# Patient Record
Sex: FEMALE | URBAN - METROPOLITAN AREA
[De-identification: names, ages, dates, MRNs, and addresses within clinical notes are randomized per-mention and may not be internally consistent; named-entity substitution may affect disease eponyms.]

---

## 2018-08-10 ENCOUNTER — RECORDS - HEALTHEAST (OUTPATIENT)
Dept: LAB | Facility: CLINIC | Age: 83
End: 2018-08-10

## 2018-08-13 LAB
ANION GAP SERPL CALCULATED.3IONS-SCNC: 14 MMOL/L (ref 5–18)
BUN SERPL-MCNC: 16 MG/DL (ref 8–28)
CALCIUM SERPL-MCNC: 10.3 MG/DL (ref 8.5–10.5)
CHLORIDE BLD-SCNC: 104 MMOL/L (ref 98–107)
CO2 SERPL-SCNC: 20 MMOL/L (ref 22–31)
CREAT SERPL-MCNC: 0.78 MG/DL (ref 0.6–1.1)
ERYTHROCYTE [DISTWIDTH] IN BLOOD BY AUTOMATED COUNT: 15.1 % (ref 11–14.5)
GFR SERPL CREATININE-BSD FRML MDRD: >60 ML/MIN/1.73M2
GLUCOSE BLD-MCNC: 175 MG/DL (ref 70–125)
HBA1C MFR BLD: 6.2 % (ref 4.2–6.1)
HCT VFR BLD AUTO: 39.9 % (ref 35–47)
HGB BLD-MCNC: 12.2 G/DL (ref 12–16)
MCH RBC QN AUTO: 27.5 PG (ref 27–34)
MCHC RBC AUTO-ENTMCNC: 30.6 G/DL (ref 32–36)
MCV RBC AUTO: 90 FL (ref 80–100)
PLATELET # BLD AUTO: 639 THOU/UL (ref 140–440)
PMV BLD AUTO: 9.4 FL (ref 8.5–12.5)
POTASSIUM BLD-SCNC: 4.6 MMOL/L (ref 3.5–5)
RBC # BLD AUTO: 4.43 MILL/UL (ref 3.8–5.4)
SODIUM SERPL-SCNC: 138 MMOL/L (ref 136–145)
TSH SERPL DL<=0.005 MIU/L-ACNC: 1.81 UIU/ML (ref 0.3–5)
WBC: 10.5 THOU/UL (ref 4–11)

## 2018-08-28 ENCOUNTER — TRANSFERRED RECORDS (OUTPATIENT)
Dept: HEALTH INFORMATION MANAGEMENT | Facility: CLINIC | Age: 83
End: 2018-08-28

## 2018-08-28 ENCOUNTER — PRE VISIT (OUTPATIENT)
Dept: OPHTHALMOLOGY | Facility: CLINIC | Age: 83
End: 2018-08-28

## 2018-08-28 PROBLEM — E11.9 DIABETES MELLITUS (H): Status: ACTIVE | Noted: 2018-08-04

## 2018-08-28 PROBLEM — K57.92 DIVERTICULITIS: Status: ACTIVE | Noted: 2018-08-05

## 2018-08-28 PROBLEM — I10 HTN (HYPERTENSION): Status: ACTIVE | Noted: 2018-08-04

## 2018-08-28 PROBLEM — R12 HEARTBURN: Status: ACTIVE | Noted: 2018-08-04

## 2018-08-28 PROBLEM — N39.0 UTI (URINARY TRACT INFECTION): Status: ACTIVE | Noted: 2018-08-04

## 2018-08-28 RX ORDER — LEVOTHYROXINE SODIUM 112 UG/1
112 TABLET ORAL DAILY
COMMUNITY

## 2018-08-28 RX ORDER — FLUOCINONIDE 0.5 MG/G
1 CREAM TOPICAL
COMMUNITY

## 2018-08-28 RX ORDER — PRAVASTATIN SODIUM 40 MG
1 TABLET ORAL DAILY
COMMUNITY
Start: 2013-10-03

## 2018-08-28 RX ORDER — ACETAMINOPHEN 160 MG
2000 TABLET,DISINTEGRATING ORAL
COMMUNITY

## 2018-08-28 RX ORDER — ENALAPRIL MALEATE 10 MG/1
1 TABLET ORAL DAILY
COMMUNITY
Start: 2013-10-09

## 2018-08-28 RX ORDER — PREDNISONE 5 MG/1
2.5 TABLET ORAL DAILY
COMMUNITY

## 2018-08-28 RX ORDER — ESTRADIOL 1 MG/1
1 TABLET ORAL DAILY
COMMUNITY
Start: 2013-10-17

## 2018-08-28 NOTE — TELEPHONE ENCOUNTER
For the evaluation of   Chief Complaint   Patient presents with     Previsit     appt w/ Dr Cho     Yag Laser     eval left eye refered by Dr Garner at [facility] in [place]       When was your last eye exam w/ Dilation? 2 week(s)  Do you wear glasses? Yes Please bring them with you to your appointment.  Do you wear contacts? No  How many hours per day to you wear your lenses? not applicable  Please bring the boxes with you to your appointment.      HPI:  Location:   OS     Symptoms:   blurred vision left  Pertinent Negatives:   Negative for vision changes or eye problems  Severity:   moderate  Duration:   months   Timing:   gradual onset  Other:     POH:  1- s/p CE PC IOL OU  2-  Dry Eye  3-       Do you use any eye drops? yes  For what condition(s)?dry eye    Ocular Meds:  systane       ROS:    Review Of Systems  Eyes: as above  Endocrine:  positive for thyroid disorder    Allergies:    Allergies   Allergen Reactions     Penicillins Swelling     Sulfa Drugs Hives     Tobramycin Unknown     Redness of the eyes with tobramycin eye drops       Systemic Medications:   Current Outpatient Prescriptions   Medication     Cholecalciferol (VITAMIN D3) 2000 units CAPS     enalapril (VASOTEC) 10 MG tablet     estradiol (ESTRACE) 1 MG tablet     ketotifen (ZADITOR/REFRESH ANTI-ITCH) 0.025 % SOLN ophthalmic solution     levothyroxine (SYNTHROID/LEVOTHROID) 112 MCG tablet     omeprazole (PRILOSEC) 20 MG CR capsule     polyethyl glycol-propyl glycol 0.4-0.3 % GEL     pravastatin (PRAVACHOL) 40 MG tablet     predniSONE (DELTASONE) 5 MG tablet     sertraline (ZOLOFT) 50 MG tablet     traMADol-acetaminophen (ULTRACET) 37.5-325 MG per tablet     fluocinonide (LIDEX) 0.05 % cream     No current facility-administered medications for this visit.        Family History   Problem Relation Age of Onset     Cancer Brother      Diabetes Brother      Hypertension Brother      Cataracts Brother      Cancer Sister      Diabetes Sister       Hypertension Sister      Cataracts Sister      Thyroid Disease No family hx of        Social History   Substance Use Topics     Smoking status: Never Smoker     Smokeless tobacco: Never Used     Alcohol use No     Kay SARAVIA. OSC

## 2018-09-10 ENCOUNTER — OFFICE VISIT (OUTPATIENT)
Dept: OPHTHALMOLOGY | Facility: CLINIC | Age: 83
End: 2018-09-10
Payer: MEDICARE

## 2018-09-10 DIAGNOSIS — H26.492 LEFT POSTERIOR CAPSULAR OPACIFICATION: Primary | ICD-10-CM

## 2018-09-10 PROCEDURE — 66821 AFTER CATARACT LASER SURGERY: CPT | Mod: LT | Performed by: OPHTHALMOLOGY

## 2018-09-10 ASSESSMENT — TONOMETRY
OS_IOP_MMHG: 17
IOP_METHOD: TONOPEN
OD_IOP_MMHG: 18

## 2018-09-10 ASSESSMENT — VISUAL ACUITY
OS_SC: 20/40
METHOD: SNELLEN - LINEAR
OD_SC: 20/50

## 2018-09-10 NOTE — NURSING NOTE
Patient presents with:  Yag Laser: left eye only, refered by Dr Pascual Hinojosa in [place]      Ref[facility] Provider:  [facility]  [id number] [place] [address]    HPI    Affected eye(s):  Left   Symptoms:     Blurred vision   Difficulty with reading   Difficulty watching television   Glare   Halos      Frequency:  Constant, Daily       Do you have eye pain now?:  No      Comments:     Yag Laser: left eye only, refered by Dr Pascual Hinojosa in [facility]. [facility]. OSC

## 2018-09-10 NOTE — LETTER
9/10/2018       RE: Eden Navarro  [address]  [place] [address]     Dear Colleague,    Thank you for referring your patient, Eden Navarro, to the [facility] at [facility]. Please see a copy of my visit note below.    Assessment & Plan   Eden Navarro is a 86 year old female who presents with:   Review of systems for the eyes was negative other than the pertinent positives and negatives noted in the HPI.  History is obtained from the patient.    Chief Complaint   Patient presents with     Yag Laser     left eye only, refered by Dr Garner [facility] in [place]       Left posterior capsular opacification  - YAG Capsulotomy OS (left eye)    Return in about 2 weeks (around 9/24/2018) for post op with Dr Garner.    Documentation for today's encounter was performed by Kay Hunt [facility]. OSC. Acting as a scribe in my presence. I have reviewed and verified that it is an accurate recording of today's encounter.    Attending Physician Attestation:  Complete documentation of historical and exam elements from today's encounter can be found in the full encounter summary report (not reduplicated in this progress note).  I personally obtained the chief complaint(s) and history of present illness.  I confirmed and edited as necessary the review of systems, past medical/surgical history, family history, social history, and examination findings as documented by others; and I examined the patient myself.  I personally reviewed the relevant tests, images, and reports as documented above.  I formulated and edited as necessary the assessment and plan and discussed the findings and management plan with the patient and family. - Raghu Cho MD      Again, thank you for allowing me to participate in the care of your patient.      Sincerely,    Raghu Cho MD

## 2018-09-10 NOTE — PATIENT INSTRUCTIONS
YAG Capsulotomy/Iridotomy Laser Surgery    Postoperative Instructions    Postoperative Medications: After surgery, you will use one eye drop for seven days. Which you will start these eye drops on the day of surgery. Your first dose will be given in the clinic prior to you leaving.  Prednisolone - is a steroid eye drop, used to minimize inflammation and modulate healing. It should be used 4 times daily for 1 week. It is a suspension so you will need to shake it before use.  (Name brands for Prednisilone include: Pred Forte, Omnipred,  Econopred, Durezol)  A convenient way of remembering the drops is to use them at Breakfast, Lunch, Dinner,and Bedtime.  The drops might sting a little when they are instilled, and that is normal.  Please continue any glaucoma, dry eye, or other medications you were using prior to the surgery. Wait 1-2 minutes between eye drops.  Please allow 24 to 48 hours when requesting refills, and call BEFORE you run out of  drops.  Restriction on Activities:  - You may develop a slight headache following the treatment. If desired, a pain reliever such as Ibuprofen, Advil, or Motrin may be used.  - It is fine to bathe, read, watch TV, and use the computer.  Symptoms requiring medical attention:  - Sudden onset of increased flashes and/or floaters beyond what you had prior to  leaving the surgery center.  - Persistent or increasing pain in the eye  - Sudden decrease in vision  - Nausea or vomiting  If you have any questions or concerns before or after your surgery, please contact  Dr. Cho s office at [phone]

## 2018-09-10 NOTE — PROGRESS NOTES
Assessment & Plan   Eden Navarro is a 86 year old female who presents with:   Review of systems for the eyes was negative other than the pertinent positives and negatives noted in the HPI.  History is obtained from the patient.    Chief Complaint   Patient presents with     Yag Laser     left eye only, refered by Dr Garner [facility] in [place]       Left posterior capsular opacification  - YAG Capsulotomy OS (left eye)    Return in about 2 weeks (around 9/24/2018) for post op with Dr Garner.    Documentation for today's encounter was performed by Kay Hunt [facility]. OSC. Acting as a scribe in my presence. I have reviewed and verified that it is an accurate recording of today's encounter.    Attending Physician Attestation:  Complete documentation of historical and exam elements from today's encounter can be found in the full encounter summary report (not reduplicated in this progress note).  I personally obtained the chief complaint(s) and history of present illness.  I confirmed and edited as necessary the review of systems, past medical/surgical history, family history, social history, and examination findings as documented by others; and I examined the patient myself.  I personally reviewed the relevant tests, images, and reports as documented above.  I formulated and edited as necessary the assessment and plan and discussed the findings and management plan with the patient and family. - Raghu Cho MD

## 2018-09-10 NOTE — MR AVS SNAPSHOT
After Visit Summary   9/10/2018    Eden Navarro    MRN: [id number]           Patient Information     Date Of Birth          3/21/1932        Visit Information        Provider Department      9/10/2018 11:30 AM Raghu Cho MD [facility]        Today's Diagnoses     Left posterior capsular opacification    -  1      Care Instructions    YAG Capsulotomy/Iridotomy Laser Surgery    Postoperative Instructions    Postoperative Medications: After surgery, you will use one eye drop for seven days. Which you will start these eye drops on the day of surgery. Your first dose will be given in the clinic prior to you leaving.  Prednisolone - is a steroid eye drop, used to minimize inflammation and modulate healing. It should be used 4 times daily for 1 week. It is a suspension so you will need to shake it before use.  (Name brands for Prednisilone include: Pred Forte, Omnipred,  Econopred, Durezol)  A convenient way of remembering the drops is to use them at Breakfast, Lunch, Dinner,and Bedtime.  The drops might sting a little when they are instilled, and that is normal.  Please continue any glaucoma, dry eye, or other medications you were using prior to the surgery. Wait 1-2 minutes between eye drops.  Please allow 24 to 48 hours when requesting refills, and call BEFORE you run out of  drops.  Restriction on Activities:  - You may develop a slight headache following the treatment. If desired, a pain reliever such as Ibuprofen, Advil, or Motrin may be used.  - It is fine to bathe, read, watch TV, and use the computer.  Symptoms requiring medical attention:  - Sudden onset of increased flashes and/or floaters beyond what you had prior to  leaving the surgery center.  - Persistent or increasing pain in the eye  - Sudden decrease in vision  - Nausea or vomiting  If you have any questions or concerns before or after your surgery, please contact  Dr. Cho s office at [phone]            Follow-ups after your visit        Follow-up notes from your care team     Return in about 2 weeks (around 9/24/2018) for post op with Dr Garner.      Your next 10 appointments already scheduled     Sep 10, 2018 11:30 AM CDT   (Arrive by 11:00 AM)   New Visit with Raghu Cho MD   [facility] ([facility])    [address] [place] [id number] [phone]              Who to contact     If you have questions or need follow up information about today's clinic visit or your schedule please contact [facility] directly at [phone].  Normal or non-critical lab and imaging results will be communicated to you by MyChart, letter or phone within 4 business days after the clinic has received the results. If you do not hear from us within 7 days, please contact the clinic through MyChart or phone. If you have a critical or abnormal lab result, we will notify you by phone as soon as possible.  Submit refill requests through [employer] or call your pharmacy and they will forward the refill request to us. Please allow 3 business days for your refill to be completed.          Additional Information About Your Visit        Care EveryWhere ID     This is your Care EveryWhere ID. This could be used by other organizations to access your [facility] medical records  [id number]         Blood Pressure from Last 3 Encounters:   No data found for BP    Weight from Last 3 Encounters:   No data found for Wt              Today, you had the following     No orders found for display       Primary Care Provider Fax #    Physician No Ref-Primary [phone]       No address on file        Equal Access to Services     MIGUEL A JUARES : Mary mahajan Sosteffi, waaxda luqadaha, qaybta kaalmada nikhil, génesis allan. So [facility] [phone].    ATENCIÓN: Si habla español, tiene a carreon disposición servicios gratuitos de  asistencia lingüística. Ángela al [phone].    We comply with applicable federal civil rights laws and [place] laws. We do not discriminate on the basis of race, color, national origin, age, disability, sex, sexual orientation, or gender identity.            Thank you!     Thank you for choosing [facility]  for your care. Our goal is always to provide you with excellent care. Hearing back from our patients is one way we can continue to improve our services. Please take a few minutes to complete the written survey that you may receive in the mail after your visit with us. Thank you!             Your Updated Medication List - Protect others around you: Learn how to safely use, store and throw away your medicines at www.disposemymeds.org.          This list is accurate as of 9/10/18 11:04 AM.  Always use your most recent med list.                   Brand Name Dispense Instructions for use Diagnosis    enalapril 10 MG tablet    VASOTEC     Take 1 tablet by mouth daily        estradiol 1 MG tablet    ESTRACE     Take 1 tablet by mouth daily        fluocinonide 0.05 % cream    LIDEX     Apply 1 Application topically        ketotifen 0.025 % Soln ophthalmic solution    ZADITOR/REFRESH ANTI-ITCH     Place 1 drop into both eyes 2 times daily        levothyroxine 112 MCG tablet    SYNTHROID/LEVOTHROID     Take 112 mcg by mouth daily        omeprazole 20 MG CR capsule    priLOSEC     Take 1 capsule by mouth daily        polyethyl glycol-propyl glycol 0.4-0.3 % Gel      Place 1 drop into both eyes as needed        pravastatin 40 MG tablet    PRAVACHOL     Take 1 tablet by mouth daily        predniSONE 5 MG tablet    DELTASONE     Take 2.5 mg by mouth daily        sertraline 50 MG tablet    ZOLOFT     Take 50 mg by mouth daily        traMADol-acetaminophen 37.5-325 MG per tablet    ULTRACET     Take 1 tablet by mouth 2 times daily        vitamin D3 2000 units Caps      Take 2,000 Units by mouth

## 2019-06-27 ENCOUNTER — RECORDS - HEALTHEAST (OUTPATIENT)
Dept: LAB | Facility: CLINIC | Age: 84
End: 2019-06-27

## 2019-06-28 ENCOUNTER — RECORDS - HEALTHEAST (OUTPATIENT)
Dept: LAB | Facility: CLINIC | Age: 84
End: 2019-06-28

## 2019-06-28 LAB
ALBUMIN UR-MCNC: NEGATIVE MG/DL
ANION GAP SERPL CALCULATED.3IONS-SCNC: 9 MMOL/L (ref 5–18)
APPEARANCE UR: ABNORMAL
BACTERIA #/AREA URNS HPF: ABNORMAL HPF
BASOPHILS # BLD AUTO: 0 THOU/UL (ref 0–0.2)
BASOPHILS NFR BLD AUTO: 0 % (ref 0–2)
BILIRUB UR QL STRIP: NEGATIVE
BUN SERPL-MCNC: 8 MG/DL (ref 8–28)
CALCIUM SERPL-MCNC: 8.7 MG/DL (ref 8.5–10.5)
CHLORIDE BLD-SCNC: 105 MMOL/L (ref 98–107)
CO2 SERPL-SCNC: 27 MMOL/L (ref 22–31)
COLOR UR AUTO: YELLOW
CREAT SERPL-MCNC: 0.58 MG/DL (ref 0.6–1.1)
EOSINOPHIL # BLD AUTO: 0.1 THOU/UL (ref 0–0.4)
EOSINOPHIL NFR BLD AUTO: 1 % (ref 0–6)
ERYTHROCYTE [DISTWIDTH] IN BLOOD BY AUTOMATED COUNT: 14.8 % (ref 11–14.5)
GFR SERPL CREATININE-BSD FRML MDRD: >60 ML/MIN/1.73M2
GLUCOSE BLD-MCNC: 77 MG/DL (ref 70–125)
GLUCOSE UR STRIP-MCNC: NEGATIVE MG/DL
HCT VFR BLD AUTO: 28.4 % (ref 35–47)
HGB BLD-MCNC: 8.7 G/DL (ref 12–16)
HGB UR QL STRIP: ABNORMAL
KETONES UR STRIP-MCNC: NEGATIVE MG/DL
LEUKOCYTE ESTERASE UR QL STRIP: ABNORMAL
LYMPHOCYTES # BLD AUTO: 1.4 THOU/UL (ref 0.8–4.4)
LYMPHOCYTES NFR BLD AUTO: 12 % (ref 20–40)
MCH RBC QN AUTO: 28.4 PG (ref 27–34)
MCHC RBC AUTO-ENTMCNC: 30.6 G/DL (ref 32–36)
MCV RBC AUTO: 93 FL (ref 80–100)
MONOCYTES # BLD AUTO: 0.8 THOU/UL (ref 0–0.9)
MONOCYTES NFR BLD AUTO: 7 % (ref 2–10)
MUCOUS THREADS #/AREA URNS LPF: ABNORMAL LPF
NEUTROPHILS # BLD AUTO: 9.4 THOU/UL (ref 2–7.7)
NEUTROPHILS NFR BLD AUTO: 80 % (ref 50–70)
NITRATE UR QL: NEGATIVE
PH UR STRIP: 6.5 [PH] (ref 4.5–8)
PLATELET # BLD AUTO: 664 THOU/UL (ref 140–440)
PMV BLD AUTO: 9.4 FL (ref 8.5–12.5)
POTASSIUM BLD-SCNC: 3.2 MMOL/L (ref 3.5–5)
RBC # BLD AUTO: 3.06 MILL/UL (ref 3.8–5.4)
RBC #/AREA URNS AUTO: ABNORMAL HPF
SODIUM SERPL-SCNC: 141 MMOL/L (ref 136–145)
SP GR UR STRIP: 1.01 (ref 1–1.03)
SQUAMOUS #/AREA URNS AUTO: >100 LPF
UROBILINOGEN UR STRIP-ACNC: ABNORMAL
WBC #/AREA URNS AUTO: ABNORMAL HPF
WBC: 12.1 THOU/UL (ref 4–11)
YEAST #/AREA URNS HPF: ABNORMAL HPF

## 2019-06-29 LAB — BACTERIA SPEC CULT: NO GROWTH

## 2019-07-01 LAB
ANION GAP SERPL CALCULATED.3IONS-SCNC: 9 MMOL/L (ref 5–18)
BASOPHILS # BLD AUTO: 0 THOU/UL (ref 0–0.2)
BASOPHILS NFR BLD AUTO: 0 % (ref 0–2)
BUN SERPL-MCNC: 6 MG/DL (ref 8–28)
CALCIUM SERPL-MCNC: 9.2 MG/DL (ref 8.5–10.5)
CHLORIDE BLD-SCNC: 108 MMOL/L (ref 98–107)
CO2 SERPL-SCNC: 25 MMOL/L (ref 22–31)
CREAT SERPL-MCNC: 0.61 MG/DL (ref 0.6–1.1)
EOSINOPHIL # BLD AUTO: 0.2 THOU/UL (ref 0–0.4)
EOSINOPHIL NFR BLD AUTO: 1 % (ref 0–6)
ERYTHROCYTE [DISTWIDTH] IN BLOOD BY AUTOMATED COUNT: 15 % (ref 11–14.5)
GFR SERPL CREATININE-BSD FRML MDRD: >60 ML/MIN/1.73M2
GLUCOSE BLD-MCNC: 84 MG/DL (ref 70–125)
HCT VFR BLD AUTO: 30.3 % (ref 35–47)
HGB BLD-MCNC: 9.4 G/DL (ref 12–16)
LYMPHOCYTES # BLD AUTO: 1.7 THOU/UL (ref 0.8–4.4)
LYMPHOCYTES NFR BLD AUTO: 14 % (ref 20–40)
MCH RBC QN AUTO: 29.2 PG (ref 27–34)
MCHC RBC AUTO-ENTMCNC: 31 G/DL (ref 32–36)
MCV RBC AUTO: 94 FL (ref 80–100)
MONOCYTES # BLD AUTO: 0.9 THOU/UL (ref 0–0.9)
MONOCYTES NFR BLD AUTO: 7 % (ref 2–10)
NEUTROPHILS # BLD AUTO: 8.7 THOU/UL (ref 2–7.7)
NEUTROPHILS NFR BLD AUTO: 77 % (ref 50–70)
PLATELET # BLD AUTO: 861 THOU/UL (ref 140–440)
PMV BLD AUTO: 9 FL (ref 8.5–12.5)
POTASSIUM BLD-SCNC: 3.9 MMOL/L (ref 3.5–5)
RBC # BLD AUTO: 3.22 MILL/UL (ref 3.8–5.4)
SODIUM SERPL-SCNC: 142 MMOL/L (ref 136–145)
WBC: 11.5 THOU/UL (ref 4–11)

## 2019-07-02 ENCOUNTER — RECORDS - HEALTHEAST (OUTPATIENT)
Dept: LAB | Facility: CLINIC | Age: 84
End: 2019-07-02

## 2019-07-03 LAB
ERYTHROCYTE [DISTWIDTH] IN BLOOD BY AUTOMATED COUNT: 15.1 % (ref 11–14.5)
HCT VFR BLD AUTO: 28 % (ref 35–47)
HGB BLD-MCNC: 8.6 G/DL (ref 12–16)
MCH RBC QN AUTO: 28.8 PG (ref 27–34)
MCHC RBC AUTO-ENTMCNC: 30.7 G/DL (ref 32–36)
MCV RBC AUTO: 94 FL (ref 80–100)
PLATELET # BLD AUTO: 772 THOU/UL (ref 140–440)
PMV BLD AUTO: 8.5 FL (ref 8.5–12.5)
RBC # BLD AUTO: 2.99 MILL/UL (ref 3.8–5.4)
WBC: 10 THOU/UL (ref 4–11)

## 2019-07-05 ENCOUNTER — RECORDS - HEALTHEAST (OUTPATIENT)
Dept: LAB | Facility: CLINIC | Age: 84
End: 2019-07-05

## 2019-07-08 LAB
ANION GAP SERPL CALCULATED.3IONS-SCNC: 10 MMOL/L (ref 5–18)
BUN SERPL-MCNC: 11 MG/DL (ref 8–28)
CALCIUM SERPL-MCNC: 9.4 MG/DL (ref 8.5–10.5)
CHLORIDE BLD-SCNC: 107 MMOL/L (ref 98–107)
CO2 SERPL-SCNC: 23 MMOL/L (ref 22–31)
CREAT SERPL-MCNC: 0.62 MG/DL (ref 0.6–1.1)
ERYTHROCYTE [DISTWIDTH] IN BLOOD BY AUTOMATED COUNT: 14.9 % (ref 11–14.5)
GFR SERPL CREATININE-BSD FRML MDRD: >60 ML/MIN/1.73M2
GLUCOSE BLD-MCNC: 84 MG/DL (ref 70–125)
HCT VFR BLD AUTO: 29.9 % (ref 35–47)
HGB BLD-MCNC: 9.3 G/DL (ref 12–16)
MCH RBC QN AUTO: 28.3 PG (ref 27–34)
MCHC RBC AUTO-ENTMCNC: 31.1 G/DL (ref 32–36)
MCV RBC AUTO: 91 FL (ref 80–100)
PLATELET # BLD AUTO: 562 THOU/UL (ref 140–440)
PMV BLD AUTO: 8.7 FL (ref 8.5–12.5)
POTASSIUM BLD-SCNC: 4.1 MMOL/L (ref 3.5–5)
RBC # BLD AUTO: 3.29 MILL/UL (ref 3.8–5.4)
SODIUM SERPL-SCNC: 140 MMOL/L (ref 136–145)
WBC: 7 THOU/UL (ref 4–11)

## 2019-07-09 ENCOUNTER — RECORDS - HEALTHEAST (OUTPATIENT)
Dept: LAB | Facility: CLINIC | Age: 84
End: 2019-07-09

## 2019-07-10 LAB
ERYTHROCYTE [DISTWIDTH] IN BLOOD BY AUTOMATED COUNT: 14.7 % (ref 11–14.5)
HBA1C MFR BLD: 5.9 % (ref 4.2–6.1)
HCT VFR BLD AUTO: 30.8 % (ref 35–47)
HGB BLD-MCNC: 9.6 G/DL (ref 12–16)
MCH RBC QN AUTO: 28.4 PG (ref 27–34)
MCHC RBC AUTO-ENTMCNC: 31.2 G/DL (ref 32–36)
MCV RBC AUTO: 91 FL (ref 80–100)
PLATELET # BLD AUTO: 499 THOU/UL (ref 140–440)
PMV BLD AUTO: 8.7 FL (ref 8.5–12.5)
RBC # BLD AUTO: 3.38 MILL/UL (ref 3.8–5.4)
WBC: 5.4 THOU/UL (ref 4–11)

## 2019-07-12 ENCOUNTER — RECORDS - HEALTHEAST (OUTPATIENT)
Dept: LAB | Facility: CLINIC | Age: 84
End: 2019-07-12

## 2019-07-15 LAB
T4 FREE SERPL-MCNC: 1.1 NG/DL (ref 0.7–1.8)
TSH SERPL DL<=0.005 MIU/L-ACNC: 0.83 UIU/ML (ref 0.3–5)

## 2019-07-18 ENCOUNTER — RECORDS - HEALTHEAST (OUTPATIENT)
Dept: LAB | Facility: CLINIC | Age: 84
End: 2019-07-18

## 2019-07-19 LAB — POTASSIUM BLD-SCNC: 4 MMOL/L (ref 3.5–5)

## 2023-04-06 ENCOUNTER — NURSE TRIAGE (OUTPATIENT)
Dept: NURSING | Facility: CLINIC | Age: 88
End: 2023-04-06
Payer: MEDICARE

## 2023-04-06 NOTE — TELEPHONE ENCOUNTER
Pt son calling, consent to communicate on file, pt have food stuck in esophagus, unable to swallow  Paramedics where there, did not take pt into ED.   Can still swallow but unable to get fluids down.     Triage to ED, pt's PCP is with [facility], pt son wants to see if pt can get appointment to be seen, advised to call PCP clinic, go to ED.    Jeffrey Granda, RN, BSN  4/6/2023 at 1:55 PM  [facility]        Reason for Disposition    SEVERE difficulty swallowing (e.g., drooling or spitting, can't swallow water)    Additional Information    Negative: SEVERE difficulty swallowing (e.g., drooling or spitting) and started suddenly after taking a medicine or allergic foods    Negative: Wheezing, stridor, hoarseness, or difficulty breathing    Negative: Swollen tongue and sudden onset    Negative: Sounds like a life-threatening emergency to the triager    Protocols used: SWALLOWING DIFFICULTY-A-OH

## 2024-11-11 ENCOUNTER — LAB REQUISITION (OUTPATIENT)
Dept: LAB | Facility: CLINIC | Age: 89
End: 2024-11-11
Payer: MEDICARE

## 2024-11-11 DIAGNOSIS — N39.0 URINARY TRACT INFECTION, SITE NOT SPECIFIED: ICD-10-CM

## 2024-11-11 LAB
ALBUMIN UR-MCNC: 100 MG/DL
APPEARANCE UR: ABNORMAL
BACTERIA #/AREA URNS HPF: ABNORMAL /HPF
BILIRUB UR QL STRIP: NEGATIVE
COLOR UR AUTO: YELLOW
GLUCOSE UR STRIP-MCNC: NEGATIVE MG/DL
HGB UR QL STRIP: NEGATIVE
KETONES UR STRIP-MCNC: NEGATIVE MG/DL
LEUKOCYTE ESTERASE UR QL STRIP: ABNORMAL
NITRATE UR QL: NEGATIVE
PH UR STRIP: 5 [PH] (ref 5–7)
RBC URINE: 7 /HPF
SP GR UR STRIP: 1.02 (ref 1–1.03)
UROBILINOGEN UR STRIP-MCNC: NORMAL MG/DL
WBC CLUMPS #/AREA URNS HPF: PRESENT /HPF
WBC URINE: >182 /HPF

## 2024-11-11 PROCEDURE — 87186 SC STD MICRODIL/AGAR DIL: CPT | Mod: ORL | Performed by: PHYSICIAN ASSISTANT

## 2024-11-11 PROCEDURE — 87088 URINE BACTERIA CULTURE: CPT | Mod: ORL | Performed by: PHYSICIAN ASSISTANT

## 2024-11-11 PROCEDURE — 81001 URINALYSIS AUTO W/SCOPE: CPT | Mod: ORL | Performed by: PHYSICIAN ASSISTANT

## 2024-11-12 LAB — BACTERIA UR CULT: ABNORMAL
